# Patient Record
Sex: MALE | Race: WHITE | Employment: OTHER | URBAN - METROPOLITAN AREA
[De-identification: names, ages, dates, MRNs, and addresses within clinical notes are randomized per-mention and may not be internally consistent; named-entity substitution may affect disease eponyms.]

---

## 2017-01-25 ENCOUNTER — HOSPITAL ENCOUNTER (OUTPATIENT)
Dept: LAB | Age: 72
Discharge: HOME | End: 2017-01-25

## 2017-05-16 ENCOUNTER — HOSPITAL ENCOUNTER (OUTPATIENT)
Dept: LAB | Age: 72
Discharge: HOME | End: 2017-05-16

## 2017-07-11 ENCOUNTER — HOSPITAL ENCOUNTER (OUTPATIENT)
Dept: LAB | Age: 72
Discharge: HOME | End: 2017-07-11

## 2017-11-16 ENCOUNTER — HOSPITAL ENCOUNTER (OUTPATIENT)
Dept: LAB | Age: 72
Discharge: HOME | End: 2017-11-16

## 2018-05-23 ENCOUNTER — HOSPITAL ENCOUNTER (OUTPATIENT)
Dept: LAB | Age: 73
Discharge: HOME | End: 2018-05-23

## 2018-06-01 PROBLEM — N20.0 KIDNEY STONES: Status: ACTIVE | Noted: 2018-06-01

## 2019-01-10 ENCOUNTER — HOSPITAL ENCOUNTER (OUTPATIENT)
Dept: LAB | Age: 74
Discharge: HOME | End: 2019-01-10

## 2020-06-11 PROBLEM — M72.0 DUPUYTREN'S CONTRACTURE OF BOTH HANDS: Chronic | Status: ACTIVE | Noted: 2020-06-11

## 2020-06-16 ENCOUNTER — HOSPITAL ENCOUNTER (OUTPATIENT)
Dept: VASCULAR SURGERY | Age: 75
Discharge: HOME | End: 2020-06-16
Attending: INTERNAL MEDICINE
Payer: MEDICARE

## 2020-06-16 DIAGNOSIS — R09.89 BILATERAL CAROTID BRUITS: ICD-10-CM

## 2020-06-16 PROBLEM — I65.23 CAROTID STENOSIS, BILATERAL: Status: ACTIVE | Noted: 2020-06-16

## 2020-06-16 LAB
LEFT CCA DIST DIAS: 24.8 CM/S
LEFT CCA DIST SYS: 92.4 CM/S
LEFT CCA PROX DIAS: 21.2 CM/S
LEFT CCA PROX SYS: 86.9 CM/S
LEFT ECA DIAS: 5.2 CM/S
LEFT ECA SYS: 61.8 CM/S
LEFT ICA DIST DIAS: 17.9 CM/S
LEFT ICA DIST SYS: 55.3 CM/S
LEFT ICA MID DIAS: 20.6 CM/S
LEFT ICA MID SYS: 74.5 CM/S
LEFT ICA PROX DIAS: 12.9 CM/S
LEFT ICA PROX SYS: 46.8 CM/S
LEFT ICA/CCA SYS: 0.8
LEFT VERTEBRAL DIAS: 12.7 CM/S
LEFT VERTEBRAL SYS: 43.3 CM/S
RIGHT CCA DIST DIAS: 19 CM/S
RIGHT CCA DIST SYS: 82.9 CM/S
RIGHT CCA PROX DIAS: 19 CM/S
RIGHT CCA PROX SYS: 80.3 CM/S
RIGHT ECA DIAS: 8.6 CM/S
RIGHT ECA SYS: 69.8 CM/S
RIGHT ICA DIST DIAS: 20.8 CM/S
RIGHT ICA DIST SYS: 52.1 CM/S
RIGHT ICA MID DIAS: 24.2 CM/S
RIGHT ICA MID SYS: 78.9 CM/S
RIGHT ICA PROX DIAS: 17.7 CM/S
RIGHT ICA PROX SYS: 63.3 CM/S
RIGHT ICA/CCA SYS: 1
RIGHT VERTEBRAL DIAS: 21.6 CM/S
RIGHT VERTEBRAL SYS: 59.4 CM/S

## 2020-06-16 PROCEDURE — 93880 EXTRACRANIAL BILAT STUDY: CPT

## 2022-03-19 PROBLEM — M72.0 DUPUYTREN'S CONTRACTURE OF BOTH HANDS: Status: ACTIVE | Noted: 2020-06-11

## 2022-03-19 PROBLEM — I65.23 CAROTID STENOSIS, BILATERAL: Status: ACTIVE | Noted: 2020-06-16

## 2022-03-19 PROBLEM — N20.0 KIDNEY STONES: Status: ACTIVE | Noted: 2018-06-01

## 2022-09-13 ENCOUNTER — HOSPITAL ENCOUNTER (OUTPATIENT)
Age: 77
Setting detail: OUTPATIENT SURGERY
Discharge: HOME | End: 2022-09-13
Attending: INTERNAL MEDICINE | Admitting: INTERNAL MEDICINE
Payer: MEDICARE

## 2022-09-13 ENCOUNTER — ANESTHESIA (OUTPATIENT)
Dept: ENDOSCOPY | Age: 77
End: 2022-09-13
Payer: MEDICARE

## 2022-09-13 ENCOUNTER — ANESTHESIA EVENT (OUTPATIENT)
Dept: ENDOSCOPY | Age: 77
End: 2022-09-13
Payer: MEDICARE

## 2022-09-13 VITALS
RESPIRATION RATE: 18 BRPM | DIASTOLIC BLOOD PRESSURE: 60 MMHG | OXYGEN SATURATION: 96 % | TEMPERATURE: 97.6 F | HEIGHT: 71 IN | WEIGHT: 147.71 LBS | SYSTOLIC BLOOD PRESSURE: 121 MMHG | HEART RATE: 83 BPM | BODY MASS INDEX: 20.68 KG/M2

## 2022-09-13 PROCEDURE — 88305 TISSUE EXAM BY PATHOLOGIST: CPT

## 2022-09-13 PROCEDURE — 74011000250 HC RX REV CODE- 250: Performed by: NURSE ANESTHETIST, CERTIFIED REGISTERED

## 2022-09-13 PROCEDURE — 2709999900 HC NON-CHARGEABLE SUPPLY: Performed by: INTERNAL MEDICINE

## 2022-09-13 PROCEDURE — 74011250636 HC RX REV CODE- 250/636: Performed by: NURSE ANESTHETIST, CERTIFIED REGISTERED

## 2022-09-13 PROCEDURE — 76040000019: Performed by: INTERNAL MEDICINE

## 2022-09-13 PROCEDURE — 77030013992 HC SNR POLYP ENDOSC BSC -B: Performed by: INTERNAL MEDICINE

## 2022-09-13 PROCEDURE — 76060000031 HC ANESTHESIA FIRST 0.5 HR: Performed by: INTERNAL MEDICINE

## 2022-09-13 RX ORDER — SODIUM CHLORIDE 9 MG/ML
INJECTION, SOLUTION INTRAVENOUS
Status: DISCONTINUED | OUTPATIENT
Start: 2022-09-13 | End: 2022-09-13 | Stop reason: HOSPADM

## 2022-09-13 RX ORDER — EPHEDRINE SULFATE/0.9% NACL/PF 50 MG/5 ML
SYRINGE (ML) INTRAVENOUS AS NEEDED
Status: DISCONTINUED | OUTPATIENT
Start: 2022-09-13 | End: 2022-09-13 | Stop reason: HOSPADM

## 2022-09-13 RX ORDER — PHENYLEPHRINE HCL IN 0.9% NACL 0.4MG/10ML
SYRINGE (ML) INTRAVENOUS AS NEEDED
Status: DISCONTINUED | OUTPATIENT
Start: 2022-09-13 | End: 2022-09-13 | Stop reason: HOSPADM

## 2022-09-13 RX ORDER — MIDAZOLAM HYDROCHLORIDE 1 MG/ML
.25-5 INJECTION, SOLUTION INTRAMUSCULAR; INTRAVENOUS
Status: DISCONTINUED | OUTPATIENT
Start: 2022-09-13 | End: 2022-09-13 | Stop reason: HOSPADM

## 2022-09-13 RX ORDER — EPINEPHRINE 0.1 MG/ML
1 INJECTION INTRACARDIAC; INTRAVENOUS
Status: DISCONTINUED | OUTPATIENT
Start: 2022-09-13 | End: 2022-09-13 | Stop reason: HOSPADM

## 2022-09-13 RX ORDER — DEXTROMETHORPHAN/PSEUDOEPHED 2.5-7.5/.8
1.2 DROPS ORAL
Status: DISCONTINUED | OUTPATIENT
Start: 2022-09-13 | End: 2022-09-13 | Stop reason: HOSPADM

## 2022-09-13 RX ORDER — SODIUM CHLORIDE 9 MG/ML
50 INJECTION, SOLUTION INTRAVENOUS CONTINUOUS
Status: DISCONTINUED | OUTPATIENT
Start: 2022-09-13 | End: 2022-09-13 | Stop reason: HOSPADM

## 2022-09-13 RX ORDER — FLUMAZENIL 0.1 MG/ML
0.2 INJECTION INTRAVENOUS
Status: DISCONTINUED | OUTPATIENT
Start: 2022-09-13 | End: 2022-09-13 | Stop reason: HOSPADM

## 2022-09-13 RX ORDER — NALOXONE HYDROCHLORIDE 0.4 MG/ML
0.4 INJECTION, SOLUTION INTRAMUSCULAR; INTRAVENOUS; SUBCUTANEOUS
Status: DISCONTINUED | OUTPATIENT
Start: 2022-09-13 | End: 2022-09-13 | Stop reason: HOSPADM

## 2022-09-13 RX ORDER — PROPOFOL 10 MG/ML
INJECTION, EMULSION INTRAVENOUS AS NEEDED
Status: DISCONTINUED | OUTPATIENT
Start: 2022-09-13 | End: 2022-09-13 | Stop reason: HOSPADM

## 2022-09-13 RX ORDER — ATROPINE SULFATE 0.1 MG/ML
0.4 INJECTION INTRAVENOUS
Status: DISCONTINUED | OUTPATIENT
Start: 2022-09-13 | End: 2022-09-13 | Stop reason: HOSPADM

## 2022-09-13 RX ADMIN — Medication 5 MG: at 08:43

## 2022-09-13 RX ADMIN — PROPOFOL 30 MG: 10 INJECTION, EMULSION INTRAVENOUS at 08:45

## 2022-09-13 RX ADMIN — PROPOFOL 30 MG: 10 INJECTION, EMULSION INTRAVENOUS at 08:42

## 2022-09-13 RX ADMIN — SODIUM CHLORIDE: 900 INJECTION, SOLUTION INTRAVENOUS at 08:34

## 2022-09-13 RX ADMIN — PROPOFOL 30 MG: 10 INJECTION, EMULSION INTRAVENOUS at 08:54

## 2022-09-13 RX ADMIN — Medication 100 MCG: at 08:50

## 2022-09-13 RX ADMIN — PROPOFOL 30 MG: 10 INJECTION, EMULSION INTRAVENOUS at 08:57

## 2022-09-13 RX ADMIN — PROPOFOL 70 MG: 10 INJECTION, EMULSION INTRAVENOUS at 08:39

## 2022-09-13 RX ADMIN — Medication 10 MG: at 08:48

## 2022-09-13 RX ADMIN — PROPOFOL 30 MG: 10 INJECTION, EMULSION INTRAVENOUS at 08:51

## 2022-09-13 NOTE — ANESTHESIA PREPROCEDURE EVALUATION
Relevant Problems   RENAL FAILURE   (+) Kidney stones       Anesthetic History   No history of anesthetic complications            Review of Systems / Medical History  Patient summary reviewed and nursing notes reviewed    Pulmonary  Within defined limits                 Neuro/Psych              Cardiovascular              Hyperlipidemia    Exercise tolerance: >4 METS     GI/Hepatic/Renal         Renal disease: stones       Endo/Other  Within defined limits           Other Findings              Physical Exam    Airway  Mallampati: I    Neck ROM: normal range of motion   Mouth opening: Normal     Cardiovascular    Rhythm: regular  Rate: normal         Dental  No notable dental hx       Pulmonary  Breath sounds clear to auscultation               Abdominal         Other Findings            Anesthetic Plan    ASA: 1  Anesthesia type: MAC            Anesthetic plan and risks discussed with: Patient      Informed consent obtained.

## 2022-09-13 NOTE — H&P
Neo Hays M.D.  [phone]            History and Physical       NAME:  Manoj Sandoval   :   1945   MRN:   [id number]       Referring Physician:  Roosevelt Saenz MD      Consult Date: 2022 8:24 AM    Chief Complaint:  Colon cancer screening    History of Present Illness:  Patient is a 68 y.o. who is seen for colon cancer screening. Denies any ongoing GI complaints. PMH:  Past Medical History:   Diagnosis Date    Bladder cancer ([facility] [address]     Cancer ([facility] [address]     skin    Hyperlipidemia 2012    Kidney stones 2018    Right carotid bruit 2012       PSH:  Past Surgical History:   Procedure Laterality Date    HX BREAST BIOPSY      HX HEENT      right maxilllary benign mass excised     HX UROLOGICAL      Took 2 rounds of infusions for tumors on the bladder       Allergies:  No Known Allergies    Home Medications:  Prior to Admission Medications   Prescriptions Last Dose Informant Patient Reported? Taking?   aspirin 81 mg chewable tablet Unknown  No No   Sig: Take 1 Tab by mouth daily. ezetimibe (ZETIA) 10 mg tablet 2022  No Yes   Sig: TAKE 1 TABLET BY MOUTH EVERY DAY   multivitamin (ONE A DAY) tablet 2022  Yes Yes   Sig: Take 1 Tab by mouth daily. nirmatrelvir-ritonavir (PAXLOVID) 300 mg (150 mg x 2)-100 mg tablet Not Taking  No No   Sig: Use as directed   Patient not taking: Reported on 2022   rosuvastatin (CRESTOR) 40 mg tablet 2022  No Yes   Sig: TAKE 1 TABLET BY MOUTH EVERY DAY   traZODone (DESYREL) 150 mg tablet Not Taking  No No   Sig: Take 1 Tablet by mouth nightly. Patient not taking: Reported on 2022      Facility-Administered Medications: None       Hospital Medications:  No current facility-administered medications for this encounter. Social History:  Social History     Tobacco Use    Smoking status: Never     Passive exposure: Never    Smokeless tobacco: Never   Substance Use Topics    Alcohol use:  Yes Comment: occassional white wine       Family History:  Family History   Problem Relation Age of Onset    Diabetes Mother     Cancer Father         76 lung ca and ashd             Review of Systems:      Constitutional: negative fever, negative chills, negative weight loss  Eyes:   negative visual changes  ENT:   negative sore throat, tongue or lip swelling  Respiratory:  negative cough, negative dyspnea  Cards:  negative for chest pain, palpitations, lower extremity edema  GI:   See HPI  :  negative for frequency, dysuria  Integument:  negative for rash and pruritus  Heme:  negative for easy bruising and gum/nose bleeding  Musculoskel: negative for myalgias,  back pain and muscle weakness  Neuro: negative for headaches, dizziness, vertigo  Psych:  negative for feelings of anxiety, depression       Objective:   Patient Vitals for the past 8 hrs:   BP Temp Pulse Resp SpO2 Height Weight   09/13/22 0723 129/73 98 °F (36.7 °C) 80 15 97 % 5' 11\" (1.803 m) 67 kg (147 lb 11.3 oz)     No intake/output data recorded. No intake/output data recorded. EXAM:     NEURO-a&o   HEENT-wnl   LUNGS-clear    COR-regular rate and rhythym     ABD-soft , no tenderness, no rebound, good bs     EXT-no edema     Data Review     No results for input(s): WBC, HGB, HCT, PLT, HGBEXT, HCTEXT, PLTEXT in the last 72 hours. No results for input(s): NA, K, CL, CO2, BUN, CREA, GLU, PHOS, CA in the last 72 hours. No results for input(s): AP, TBIL, TP, ALB, GLOB, GGT, AML, LPSE in the last 72 hours. No lab exists for component: SGOT, GPT, AMYP, HLPSE  No results for input(s): INR, PTP, APTT, INREXT in the last 72 hours. Patient Active Problem List   Diagnosis Code    Hyperlipidemia E78.5    Bilateral carotid bruits R09.89    Kidney stones N20.0    Dupuytren's contracture of both hands M72.0    Carotid stenosis, bilateral I65.23      Assessment:     Colon cancer screening   Plan:     Colonoscopy today.      Signed By: Meghan Cabrera MD 9/13/2022  8:24 AM

## 2022-09-13 NOTE — PROCEDURES
Brad Orosco M.D.  [phone]            2022          Colonoscopy Operative Report  Hieu Houston  :  1945  [place] Medical Record Number:  [id number]      Indications:    Screening colonoscopy     :  Michael Bradshaw MD    Assistant -- None  Implants -- None    Referring Provider: Kelsey Spivey MD    Sedation:  MAC anesthesia Propofol    Pre-Procedural Exam:      Airway: clear,  No airway problems anticipated  Heart: RRR, without gallops or rubs  Lungs: clear bilaterally without wheezes, crackles, or rhonchi  Abdomen: soft, nontender, nondistended, bowel sounds present  Mental Status: awake, alert and oriented to person, place and time     Procedure Details:  After informed consent was obtained with all risks and benefits of procedure explained and preoperative exam completed, the patient was taken to the endoscopy suite and placed in the left lateral decubitus position. Upon sequential sedation as per above, a digital rectal exam was performed. The Olympus videocolonoscope  was inserted in the rectum and carefully advanced to the terminal ileum. The quality of preparation was good. The colonoscope was slowly withdrawn with careful inspection and evaluation between folds. Retroflexion in the rectum was performed. Findings:   Terminal Ileum: normal  Cecum: normal  Ascending Colon: 1  Sessile polyp(s), the largest 8 mm in size;  Transverse Colon: normal  Descending Colon: normal  Sigmoid:     - Diverticulosis  Rectum: normal    Interventions:  1 complete polypectomy were performed using cold snare  and the polyps were  retrieved    Specimen Removed:  specimen #1, 8 mm in size, located in the ascending colon removed by cold snare and retrieved for pathology    Complications: None. EBL:  None. Impression:  1 polyp removed as above                        Mild sigmoid diverticulosis noted    Recommendations:  -Await pathology.   -If adenoma is present, repeat colonoscopy in 7 years. Discharge Disposition:  Home in the company of a  when able to ambulate.     Venita Gama MD  9/13/2022  8:59 AM

## 2022-09-13 NOTE — DISCHARGE INSTRUCTIONS
[address] [facility]. Debbie Drake M.D.  [phone]          COLON DISCHARGE INSTRUCTIONS       2022    Gustavo Brown  :  1945  [place] Medical Record Number:  [id number]      COLONOSCOPY FINDINGS:  Your colonoscopy showed: 1 polyp and mild diverticulosis. DISCOMFORT:  Redness at IV site- apply warm compress to area; if redness or soreness persist- contact your physician  There may be a slight amount of blood passed from the rectum  Gaseous discomfort- walking, belching will help relieve any discomfort  You may not operate a vehicle for 12 hours  You may not engage in an occupation involving machinery or appliances for rest of today  You may not drink alcoholic beverages for at least 12 hours  Avoid making any critical decisions for at least 24 hour  DIET:   High fiber diet. - however -  remember your colon is empty and a heavy meal will produce gas. Avoid these foods:  vegetables, fried / greasy foods, carbonated drinks for today     ACTIVITY:  You may resume your normal daily activities it is recommended that you spend the remainder of the day resting -  avoid any strenuous activity. CALL M.D. ANY SIGN OF:   Increasing pain, nausea, vomiting  Abdominal distension (swelling)  New increased bleeding (oral or rectal)  Fever (chills)  Pain in chest area  Bloody discharge from nose or mouth   Shortness of breath    Follow-up Instructions:   Call Dr. Magaly Martinez if any questions or problems. Telephone # [phone]  Biopsy results will be available in  5 to 7 days  Should have a repeat colonoscopy in 7 years.

## 2022-09-13 NOTE — PERIOP NOTES

## 2022-09-13 NOTE — ANESTHESIA POSTPROCEDURE EVALUATION
Procedure(s):  COLONOSCOPY  ENDOSCOPIC POLYPECTOMY. MAC    Anesthesia Post Evaluation      Multimodal analgesia: multimodal analgesia not used between 6 hours prior to anesthesia start to PACU discharge  Patient location during evaluation: PACU  Patient participation: complete - patient participated  Level of consciousness: awake and alert  Pain score: 0  Pain management: adequate  Airway patency: patent  Anesthetic complications: no  Cardiovascular status: hemodynamically stable and acceptable  Respiratory status: acceptable  Hydration status: acceptable  Comments: Patient seen and evaluated; no concerns. Post anesthesia nausea and vomiting:  none      INITIAL Post-op Vital signs:   Vitals Value Taken Time   /60 09/13/22 0921   Temp 36.4 °C (97.6 °F) 09/13/22 0906   Pulse 85 09/13/22 0925   Resp 22 09/13/22 0925   SpO2 98 % 09/13/22 0925   Vitals shown include unvalidated device data.

## 2022-09-13 NOTE — PROGRESS NOTES
Endoscopy discharge instructions have been reviewed and given to patient. The patient verbalized understanding and acceptance of instructions. Dr. Mk Reed discussed with patient procedure findings and next steps.

## 2022-09-13 NOTE — PROGRESS NOTES
Sunny Heller  1945  [id number]    Situation:  Verbal report received from: EMILY Prescott  Procedure: Procedure(s):  COLONOSCOPY  ENDOSCOPIC POLYPECTOMY    Background:    Preoperative diagnosis: SCREENING  Postoperative diagnosis: 1.- Colonic Polyp  2.- Diverticulosis    :  Dr. Katharine Lim  Assistant(s): Endoscopy Technician-1: Cornelio Angel  Endoscopy RN-1: Brady Hwang RN    Specimens:   ID Type Source Tests Collected by Time Destination   1 : Polyp Preservative Colon, Ascending  Vera Wright MD 9/13/2022 6268 Pathology     H. Pylori  no    Assessment:  Intra-procedure medications     Anesthesia gave intra-procedure sedation and medications, see anesthesia flow sheet yes    Intravenous fluids: NS@ KVO     Vital signs stable yes    Abdominal assessment: round and soft yes    Recommendation:  Discharge patient per MD order yes.   Return to floor na  Family or Friend family  Permission to share finding with family or friend yes

## (undated) DEVICE — SNARE ENDOSCP M L240CM W27MM SHTH DIA2.4MM CHN 2.8MM OVL

## (undated) DEVICE — 3M™ CUROS™ DISINFECTING CAP FOR NEEDLELESS CONNECTORS 270/CARTON 20 CARTONS/CASE CFF1-270: Brand: CUROS™

## (undated) DEVICE — 1200 GUARD II KIT W/5MM TUBE W/O VAC TUBE: Brand: GUARDIAN

## (undated) DEVICE — SYRINGE 50ML E/T

## (undated) DEVICE — SIMPLICITY FLUFF UNDERPAD 23X36, MODERATE: Brand: SIMPLICITY

## (undated) DEVICE — BAG BELONG PT PERS CLEAR HANDL

## (undated) DEVICE — ELECTRODE,RADIOTRANSLUCENT,FOAM,3PK: Brand: MEDLINE

## (undated) DEVICE — CONTAINER SPEC 20 ML LID NEUT BUFF FORMALIN 10 % POLYPR STS

## (undated) DEVICE — SOLIDIFIER MEDC 1200ML -- CONVERT TO 356117

## (undated) DEVICE — SET ADMIN 16ML TBNG L100IN 2 Y INJ SITE IV PIGGY BK DISP

## (undated) DEVICE — POLYP TRAP: Brand: TRAPEASE®

## (undated) DEVICE — CANN NASAL O2 CAPNOGRAPHY AD -- FILTERLINE

## (undated) DEVICE — BAG SPEC BIOHZRD 10 X 10 IN --

## (undated) DEVICE — Device

## (undated) DEVICE — CUFF RMFG BP INF SZ 11 DISP -- LAWSON OEM ITEM 238915

## (undated) DEVICE — KIT COLON W/ 1.1OZ LUB AND 2 END

## (undated) DEVICE — CATH IV AUTOGRD BC PNK 20GA 25 -- INSYTE

## (undated) DEVICE — (D)SENSOR RMFG 02 PULS OXMTR -- DISC BY MFR USE ITEM 133445